# Patient Record
Sex: FEMALE | Race: WHITE | NOT HISPANIC OR LATINO | URBAN - METROPOLITAN AREA
[De-identification: names, ages, dates, MRNs, and addresses within clinical notes are randomized per-mention and may not be internally consistent; named-entity substitution may affect disease eponyms.]

---
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
## 2022-06-06 NOTE — ED PROVIDER NOTE - TOBACCO USE
06/06/22 1446   Discharge Planning   Assessment Type Discharge Planning Brief Assessment   Discharge Plan A Home   Discharge Plan B Home     Unable to assess at this time. Will try back tomorrow.    Unknown if ever smoked

## 2025-03-11 NOTE — ED PEDIATRIC TRIAGE NOTE - STATUS:
-- DO NOT REPLY / DO NOT REPLY ALL --  -- This inbox is not monitored. If this was sent to the wrong provider or department, reroute message to P ECO Reroute pool. --  -- Message is from Engagement Center Operations (ECO) --    Sooner Appt Request   Patient mother called in requesting return call back for patient wellness visit asap and also requesting patient appointment for updated vaccines asap.                Copied from CRM #[id number]. Topic:  Schedule Appointment -  Schedule Primary Care  >> Mar 7, 2025  8:31 AM Marifer ENGLAND wrote:  Cathi Conway called requesting to schedule a primary care visit with a Clinician. Checked insurance.  Looked for any active requests, recalls, service to orders, scheduling tickets prior to scheduling. The decision tree DT PC Was used for scheduling (an)     Non-Acute need. Not scheduled and followed instructions to message or transfer. Sent a message. Selected 'Wrap up CRM' and created new Telephone Encounter after clicking 'Convert to Clinical Call'. Selected reason for call 'Appointment'. Sent 'Appointment' template and routed as routine priority per Clinician KB page to appropriate clinician pool.  
Appt scheduled 3/18  
Attempt #2 Writer called to speak with patient. No answer. Voicemail full   
Left vm for patient mother to call back to schedule.   
Intact